# Patient Record
Sex: MALE
[De-identification: names, ages, dates, MRNs, and addresses within clinical notes are randomized per-mention and may not be internally consistent; named-entity substitution may affect disease eponyms.]

---

## 2020-08-21 ENCOUNTER — HOSPITAL ENCOUNTER (EMERGENCY)
Dept: HOSPITAL 5 - ED | Age: 20
LOS: 1 days | Discharge: OTHER INSTITUTION | End: 2020-08-22
Payer: SELF-PAY

## 2020-08-21 DIAGNOSIS — S62.321A: ICD-10-CM

## 2020-08-21 DIAGNOSIS — S61.112A: ICD-10-CM
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
## 2020-08-21 DIAGNOSIS — S62.303A: ICD-10-CM

## 2020-08-21 DIAGNOSIS — S62.522B: Primary | ICD-10-CM
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
## 2020-08-21 DIAGNOSIS — S62.307A: ICD-10-CM
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
## 2020-08-21 DIAGNOSIS — S62.305A: ICD-10-CM

## 2020-08-21 DIAGNOSIS — S67.22XA: ICD-10-CM

## 2020-08-21 LAB
HCT VFR BLD CALC: 42.8 % (ref 35.5–45.6)
HGB BLD-MCNC: 15.9 GM/DL (ref 11.8–15.2)
MCHC RBC AUTO-ENTMCNC: 37 % (ref 32–34)
MCV RBC AUTO: 83 FL (ref 84–94)
PLATELET # BLD: 250 K/MM3 (ref 140–440)
RBC # BLD AUTO: 5.13 M/MM3 (ref 3.65–5.03)

## 2020-08-21 PROCEDURE — 85027 COMPLETE CBC AUTOMATED: CPT

## 2020-08-21 PROCEDURE — 96365 THER/PROPH/DIAG IV INF INIT: CPT

## 2020-08-21 PROCEDURE — 36415 COLL VENOUS BLD VENIPUNCTURE: CPT

## 2020-08-21 PROCEDURE — 96375 TX/PRO/DX INJ NEW DRUG ADDON: CPT

## 2020-08-21 PROCEDURE — 86900 BLOOD TYPING SEROLOGIC ABO: CPT

## 2020-08-21 PROCEDURE — 99291 CRITICAL CARE FIRST HOUR: CPT

## 2020-08-21 PROCEDURE — 80053 COMPREHEN METABOLIC PANEL: CPT

## 2020-08-21 PROCEDURE — 90715 TDAP VACCINE 7 YRS/> IM: CPT

## 2020-08-21 PROCEDURE — 86901 BLOOD TYPING SEROLOGIC RH(D): CPT

## 2020-08-21 PROCEDURE — 73120 X-RAY EXAM OF HAND: CPT

## 2020-08-21 PROCEDURE — 96376 TX/PRO/DX INJ SAME DRUG ADON: CPT

## 2020-08-21 PROCEDURE — 86850 RBC ANTIBODY SCREEN: CPT

## 2020-08-21 PROCEDURE — 96361 HYDRATE IV INFUSION ADD-ON: CPT

## 2020-08-21 PROCEDURE — 90471 IMMUNIZATION ADMIN: CPT

## 2020-08-21 NOTE — XRAY REPORT
EXAMINATION: Left hand radiograph, 2 views



CLINICAL INFORMATION: Crush injury to the hand



COMPARISON: None.



FINDINGS: There are displaced slightly angulated fractures extending through the distal aspects of th
e second, third, fourth and fifth metacarpals with diffuse associated soft tissue swelling/soft tissu
e injury. There is also an oblique displaced fracture through the distal phalanx of the first digit.



Signer Name: Praveena Knight MD 

Signed: 8/21/2020 5:45 PM

Workstation Name: [id number]

## 2020-08-21 NOTE — EMERGENCY DEPARTMENT REPORT
Upper Extremity





- HPI


Chief Complaint: Extremity Injury, Upper


Stated Complaint: LEFT HAND LACERATION


Time Seen by Provider: 08/21/20 23:01


Upper Extremity: Left Hand, Left Thumb, Right Thumb


Occurred When: Today


Severity: severe


Symptoms: Yes Pain with Movement, Yes Deformity, Yes Limited Range of Movement, 

Yes Swelling, Yes Laceration or Abrasion


Other History: Patient is a 20-year-old male that presents emergency room with 

complaints of left hand pain.  Patient states he was at work and a steel press 

came down on his left hand and crushed it.  Patient states he got his hand 

caught and was unable to get it out.  Patient states he is unable to move his 

hand due to pain.  Patient states he has a severe deformity to his left hand.  

Patient states he is bleeding from his thumb because the pressure ripped his 

nail off.  Patient does not know when his last tetanus was.  Patient states the 

pain is 10 out of 10.  Patient states the pain is worse with movement and better

with rest.





ED Review of Systems


ROS: 


Stated complaint: LEFT HAND LACERATION


Other details as noted in HPI





Constitutional: denies: chills, fever


Eyes: denies: eye pain, eye discharge, vision change


ENT: denies: ear pain, throat pain


Respiratory: denies: cough, shortness of breath, wheezing


Cardiovascular: denies: chest pain, palpitations


Endocrine: no symptoms reported


Gastrointestinal: denies: abdominal pain, nausea, diarrhea


Genitourinary: denies: urgency, dysuria


Musculoskeletal: denies: back pain, joint swelling, arthralgia


Skin: denies: rash, lesions


Neurological: denies: headache, weakness, paresthesias


Psychiatric: denies: anxiety, depression


Hematological/Lymphatic: denies: easy bleeding, easy bruising





ED Past Medical Hx





- Past Medical History


Previous Medical History?: No





- Surgical History


Past Surgical History?: No





- Family History


Family history: no significant





- Social History


Smoking Status: Never Smoker


Substance Use Type: None





Upper Extremity Exam





- Exam


General: 


Vital signs noted. No distress. Alert and acting appropriately.





Head and Torso: No HEENT Abnormality, No Neck Tenderness, No Chest/Lungs 

Abnormality, No Abdominal Tenderness, No Back Tenderness


Shoulder Exam: Yes Normal Range of Motion in Shoulder, No Shoulder Tenderness, 

No Clavicle Tenderness, No Shoulder Deformity, No AC Joint Tenderness


Arm Exam: No Arm/Humerus Tenderness, No Arm Deformity


Elbow: No Elbow Tenderness, No Normal Range of Motion in Elbow, No Elbow 

Deformity


Forearm: No Forearm Tenderness, No Forearm Deformity, No Pain with Pronation, No

Pain with Supination


Wrist: Yes Normal ROM in Wrist, No Wrist Tenderness, No Wrist Deformity, No 

Snuffbox Tenderness, No Pain with Axial Thumb Compression


Hand: Yes Hand Tenderness, Yes Hand Deformity, Yes Digit Tenderness, Yes 

Digit(s) Deformity, No Normal ROM in Digit(s)


CMS Exam: No Broken Skin, No Normal Distal Pulses, No Normal Capillary Refill, 

No Normal Distal Sensation





ED Course





- Reevaluation(s)


Reevaluation #1: 


Initial evaluation done.  Code trauma called.  Patient will have large-bore IV s

tarted and given pain medication and fluids and a tetanus shot.


08/21/20 23:01





Reevaluation #2: 


I discussed all results and clinical findings with patient.  I discussed plan of

care with patient.  Patient agrees with plan of care and transfer.  Patient will

be transferred to [facility].


08/21/20 23:29





Reevaluation #3: 


Transport is here to transfer the patient.  Patient states his pain is now an 8 

out of 10.  Patient has received multiple doses of Dilaudid.  Patient is alert 

and oriented x4.  Patient placed on 2 L of oxygen.


08/21/20 23:57








- Consultations


Consultation #1: 


I discussed case with transfer center with [facility]. [facility] attending 

has accepted the patient, Dr. Redding.


08/21/20 23:26








ED Medical Decision Making





- Lab Data


Result diagrams: 


                                 08/21/20 23:12








- Radiology Data


Radiology results: report reviewed, image reviewed


interpreted by me: 





Left hand x-ray: Distal thumb fracture displaced. second, third, fourth, fifth 

metacarpal fracture displaced.  Soft tissue swelling noted.  No foreign body 

noted.








 EXAMINATION: Left hand radiograph, 2 views 





CLINICAL INFORMATION: Crush injury to the hand 





COMPARISON: None. 





FINDINGS: There are displaced slightly angulated fractures extending through the

distal aspects of 


 the second, third, fourth and fifth metacarpals with diffuse associated soft 

tissue swelling/soft 


 tissue injury. There is also an oblique displaced fracture through the distal 

phalanx of the first 


 digit. 





- Medical Decision Making





Patient is a 20-year-old male who presents emergency room with complaints of 

left hand pain after getting his hand caught in a steel press at work.  Patient 

complains of severe pain.  Code trauma initiated after initial evaluation.  

Patient had x-ray which shows multiple fractures.  Patient's fracture to his 

left thumb is an open fracture.  Soft tissue swelling noted.  Patient given a 

tetanus.  Patient placed on fluids.  Patient given multiple doses of Dilaudid in

order to control his pain.  [facility] was consulted early in the patient's 

stay and accepted the patient to be transferred to [facility].  Patient had labs done

which were unremarkable.





- Differential Diagnosis


Hand trauma, fracture, contusion, laceration, nailbed damage.


Critical Care Time: Yes


Critical care time in (mins) excluding proc time.: 35


Critical care attestation.: 


If time is entered above; I have spent that time in minutes in the direct care 

of this critically ill patient, excluding procedure time.





Critical Care Time: 





35 minutes





ED Disposition


Clinical Impression: 


Hand crush injury


Qualifiers:


 Encounter type: initial encounter Laterality: left Qualified Code(s): S67.22XA 

- Crushing injury of left hand, initial encounter





Fracture of thumb


Qualifiers:


 Encounter type: initial encounter Fracture type: open Phalanx: distal Fracture 

alignment: displaced Laterality: left Qualified Code(s): S62.522B - Displaced 

fracture of distal phalanx of left thumb, initial encounter for open fracture





Fracture, metacarpal shaft


Qualifiers:


 Encounter type: initial encounter Metacarpal bone: second Fracture type: closed

Fracture alignment: displaced Laterality: left Qualified Code(s): S62.321A - 

Displaced fracture of shaft of second metacarpal bone, left hand, initial 

encounter for closed fracture





Fracture of fourth metacarpal bone


Qualifiers:


 Encounter type: initial encounter Fracture type: closed Metacarpal location: 

unspecified portion of metacarpal Fracture alignment: displaced Laterality: left

Qualified Code(s): S62.305A - Unspecified fracture of fourth metacarpal bone, 

left hand, initial encounter for closed fracture





Fracture of fifth metacarpal bone


Qualifiers:


 Encounter type: initial encounter Fracture type: closed Metacarpal location: 

unspecified portion of metacarpal Fracture alignment: displaced Laterality: left

Qualified Code(s): S62.307A - Unspecified fracture of fifth metacarpal bone, 

left hand, initial encounter for closed fracture





Fracture of third metacarpal bone


Qualifiers:


 Encounter type: initial encounter Fracture type: closed Metacarpal location: 

unspecified portion of metacarpal Fracture alignment: displaced Laterality: left

Qualified Code(s): S62.303A - Unspecified fracture of third metacarpal bone, 

left hand, initial encounter for closed fracture





Laceration of thumb with damage to nail


Qualifiers:


 Encounter type: initial encounter Foreign body presence: without foreign body 

Laterality: left Qualified Code(s): S61.112A - Laceration without foreign body 

of left thumb with damage to nail, initial encounter





Disposition: [id number]/[id number] ANOTHER TYPE HLTHCARE


Is pt being admited?: No


Does the pt Need Aspirin: No


Condition: Critical


Time of Disposition: 23:45

## 2020-08-22 VITALS — SYSTOLIC BLOOD PRESSURE: 182 MMHG | DIASTOLIC BLOOD PRESSURE: 114 MMHG

## 2020-08-22 LAB
ALBUMIN SERPL-MCNC: 4.9 G/DL (ref 3.9–5)
ALT SERPL-CCNC: 24 UNITS/L (ref 7–56)
BUN SERPL-MCNC: 11 MG/DL (ref 9–20)
BUN/CREAT SERPL: 11 %
CALCIUM SERPL-MCNC: 9.9 MG/DL (ref 8.4–10.2)
HEMOLYSIS INDEX: 12